# Patient Record
Sex: MALE | Race: WHITE | HISPANIC OR LATINO | Employment: OTHER | URBAN - METROPOLITAN AREA
[De-identification: names, ages, dates, MRNs, and addresses within clinical notes are randomized per-mention and may not be internally consistent; named-entity substitution may affect disease eponyms.]

---

## 2022-05-30 ENCOUNTER — HOSPITAL ENCOUNTER (OUTPATIENT)
Dept: RADIOLOGY | Facility: HOSPITAL | Age: 42
Discharge: HOME | End: 2022-05-30
Attending: PHYSICIAN ASSISTANT
Payer: COMMERCIAL

## 2022-05-30 ENCOUNTER — OFFICE VISIT (OUTPATIENT)
Dept: SPORTS MEDICINE | Facility: CLINIC | Age: 42
End: 2022-05-30
Payer: COMMERCIAL

## 2022-05-30 VITALS
WEIGHT: 142.69 LBS | BODY MASS INDEX: 24.36 KG/M2 | DIASTOLIC BLOOD PRESSURE: 74 MMHG | HEIGHT: 64 IN | HEART RATE: 60 BPM | SYSTOLIC BLOOD PRESSURE: 105 MMHG

## 2022-05-30 DIAGNOSIS — M75.21 BICEPS TENDINITIS OF RIGHT SHOULDER: ICD-10-CM

## 2022-05-30 DIAGNOSIS — M25.511 ACUTE PAIN OF RIGHT SHOULDER: Primary | ICD-10-CM

## 2022-05-30 DIAGNOSIS — M25.511 RIGHT SHOULDER PAIN, UNSPECIFIED CHRONICITY: ICD-10-CM

## 2022-05-30 PROCEDURE — 73030 X-RAY EXAM OF SHOULDER: CPT | Mod: TC,RT

## 2022-05-30 PROCEDURE — 99999 PR PBB SHADOW E&M-NEW PATIENT-LVL III: CPT | Mod: PBBFAC,,, | Performed by: PHYSICIAN ASSISTANT

## 2022-05-30 PROCEDURE — 73030 X-RAY EXAM OF SHOULDER: CPT | Mod: 26,RT,, | Performed by: RADIOLOGY

## 2022-05-30 PROCEDURE — 99999 PR PBB SHADOW E&M-NEW PATIENT-LVL III: ICD-10-PCS | Mod: PBBFAC,,, | Performed by: PHYSICIAN ASSISTANT

## 2022-05-30 PROCEDURE — 99204 PR OFFICE/OUTPT VISIT, NEW, LEVL IV, 45-59 MIN: ICD-10-PCS | Mod: S$GLB,,, | Performed by: PHYSICIAN ASSISTANT

## 2022-05-30 PROCEDURE — 99204 OFFICE O/P NEW MOD 45 MIN: CPT | Mod: S$GLB,,, | Performed by: PHYSICIAN ASSISTANT

## 2022-05-30 PROCEDURE — 73030 XR SHOULDER COMPLETE 2 OR MORE VIEWS RIGHT: ICD-10-PCS | Mod: 26,RT,, | Performed by: RADIOLOGY

## 2022-06-03 NOTE — PROGRESS NOTES
CC: right shoulder pain     41 y.o. Male [employer], who reports that the pain is severe and not responding to any conservative care.  RHD    He reports anterior and superior shoulder pain that began about 2 weeks ago shortly after doing an intense dumbbell workout prior to this. He is very active and now reports that it is hard to lift objects with his right arm and hard to do pushups due to pain.     He reports that the pain is worse with overhead activity. It also bothers him at night.    Is affecting ADLs.     He does reports a previous stinger/right shoulder injury to his right shoulder while playing football in . No surgery needed for this. He does not remember specifics.       Review of Systems   Constitution: Negative. Negative for chills, fever and night sweats.   HENT: Negative for congestion and headaches.    Eyes: Negative for blurred vision, left vision loss and right vision loss.   Cardiovascular: Negative for chest pain and syncope.   Respiratory: Negative for cough and shortness of breath.    Endocrine: Negative for polydipsia, polyphagia and polyuria.   Hematologic/Lymphatic: Negative for bleeding problem. Does not bruise/bleed easily.   Skin: Negative for dry skin, itching and rash.   Musculoskeletal: Negative for falls and muscle weakness.   Gastrointestinal: Negative for abdominal pain and bowel incontinence.   Genitourinary: Negative for bladder incontinence and nocturia.   Neurological: Negative for disturbances in coordination, loss of balance and seizures.   Psychiatric/Behavioral: Negative for depression. The patient does not have insomnia.    Allergic/Immunologic: Negative for hives and persistent infections.     PAST MEDICAL HISTORY: History reviewed. No pertinent past medical history.  PAST SURGICAL HISTORY: History reviewed. No pertinent surgical history.  FAMILY HISTORY: History reviewed. No pertinent family history.  SOCIAL HISTORY:   Social History     Socioeconomic  "History    Marital status: Single       MEDICATIONS: No current outpatient medications on file.  ALLERGIES: Review of patient's allergies indicates:  No Known Allergies    VITAL SIGNS: /74   Pulse 60   Ht 5' 3.5" (1.613 m)   Wt 64.7 kg (142 lb 11.2 oz)   BMI 24.88 kg/m²      PHYSICAL EXAMINATION:  General:  The patient is alert and oriented x 3.  Mood is pleasant.  Observation of ears, eyes and nose reveal no gross abnormalities.  No labored breathing observed.  Gait is coordinated. Patient can toe walk and heel walk without difficulty.      right SHOULDER / UPPER EXTREMITY EXAM    OBSERVATION:     Swelling  none  Deformity  none   Discoloration  none   Scapular winging none   Scars   none  Atrophy  none    TENDERNESS / CREPITUS (T/C):          T/C      T/C   Clavicle   -/-  SUPRAspinatus    -/-     AC Jt.    -/-  INFRAspinatus  -/-    SC Jt.    -/-  Deltoid    -/-      G. Tuberosity  -/-  LH BICEP groove  +/-   Acromion:  -/-  Midline Neck   -/-     Scapular Spine -/-  Trapezium   -/-   SMA Scapula  -/-  GH jt. line - post  -/-     Scapulothoracic  -/-         ROM: (* = with pain)  Right shoulder   Left shoulder        AROM (PROM)   AROM (PROM)   FE    170° (175°)     170° (175°)     ER at 0°    60°  (65°)    60°  (65°)   ER at 90° ABD  90°  (90°)*    90°  (90°)   IR at 90°  ABD   NA  (40°)     NA  (40°)      IR (spine level)   T11*    T10    STRENGTH: (* = with pain) RIGHT SHOULDER  LEFT SHOULDER   SCAPTION   5/5    5/5    IR    5/5    5/5   ER    5/5    5/5   BICEPS   5/5    5/5   Deltoid    5/5    5/5     SIGNS:  Painful side       NEER   +    ORYANS  neg    BOWERS   +    SPEEDS  +     DROP ARM   neg   BELLY PRESS neg   Superior escape none    LIFT-OFF  neg   X-Body ADD    neg    MOVING VALGUS neg        STABILITY TESTING    RIGHT SHOULDER   LEFT SHOULDER     Translation     Anterior  up face     up face    Posterior  up face    up face    Sulcus   < 10mm    < 10 mm     Signs   Apprehension "   neg      neg       Relocation   no change     no change      Jerk test  neg     neg    EXTREMITY NEURO-VASCULAR EXAM    Sensation grossly intact to light touch all dermatomal regions.    DTR 2+ Biceps, Triceps, BR and Negative Naimas sign   Grossly intact motor function at Elbow, Wrist and Hand   Distal pulses radial and ulnar 2+, brisk cap refill, symmetric.      NECK:  Painless FROM and spinous processes non-tender. Negative Spurlings sign.      OTHER FINDINGS:  + scapular dyskinesia    XRAYS:  Shoulder 3 view series right,  were obtained and reviewed  No convincing fracture or dislocation is noted. The osseous structures appear well mineralized and well aligned. Exostosis versus osteochondroma noted at the mid humerus which is correlated with an area of trauma patient reports years ago.         ASSESSMENT:   right:  1. Shoulder pain, acute   2.   Shoulder pain, impingement and biceps tendinitis   3.   Scapular dyskinesia      PLAN:    1. PT for right shoulder biceps tendinitis and scapular dyskinesia. eval and treat with HEP. Give workout modifications. PT for scapular stabilization: Scapular dyskinesia Scapular stabilization - [place] protocol, 1-3x/week x 6-8 weeks with HEP  2. Ice compresses  3. NSAIDs  4. Workout modifications discussed  RTC in 6 weeks if no improvement.        All questions were answered, pt will contact us for questions or concerns in the interim.    I made the decision to obtain old records of the patient including previous notes and imaging. New imaging was ordered today of the extremity or extremities evaluated. I independently reviewed and interpreted the radiographs and/or MRIs today as well as prior imaging.

## 2023-03-13 ENCOUNTER — LAB VISIT (OUTPATIENT)
Dept: LAB | Facility: HOSPITAL | Age: 43
End: 2023-03-13
Payer: COMMERCIAL

## 2023-03-13 ENCOUNTER — OFFICE VISIT (OUTPATIENT)
Dept: INTERNAL MEDICINE | Facility: CLINIC | Age: 43
End: 2023-03-13
Payer: COMMERCIAL

## 2023-03-13 VITALS
WEIGHT: 138 LBS | DIASTOLIC BLOOD PRESSURE: 80 MMHG | RESPIRATION RATE: 16 BRPM | OXYGEN SATURATION: 96 % | HEIGHT: 63 IN | SYSTOLIC BLOOD PRESSURE: 120 MMHG | BODY MASS INDEX: 24.45 KG/M2 | HEART RATE: 87 BPM

## 2023-03-13 DIAGNOSIS — Z13.220 SCREENING FOR HYPERCHOLESTEROLEMIA: ICD-10-CM

## 2023-03-13 DIAGNOSIS — Z83.3 FAMILY HISTORY OF DIET-CONTROLLED DIABETES: ICD-10-CM

## 2023-03-13 DIAGNOSIS — Z83.3 FAMILY HISTORY OF DIET-CONTROLLED DIABETES: Primary | ICD-10-CM

## 2023-03-13 DIAGNOSIS — J06.9 UPPER RESPIRATORY TRACT INFECTION, UNSPECIFIED TYPE: ICD-10-CM

## 2023-03-13 DIAGNOSIS — Z12.11 SCREENING FOR COLON CANCER: ICD-10-CM

## 2023-03-13 DIAGNOSIS — Z11.9 ENCOUNTER FOR SCREENING EXAMINATION FOR INFECTIOUS DISEASE: ICD-10-CM

## 2023-03-13 DIAGNOSIS — F17.200 SMOKER: ICD-10-CM

## 2023-03-13 LAB
ALBUMIN SERPL BCP-MCNC: 4.2 G/DL (ref 3.5–5.2)
ALP SERPL-CCNC: 50 U/L (ref 55–135)
ALT SERPL W/O P-5'-P-CCNC: 22 U/L (ref 10–44)
ANION GAP SERPL CALC-SCNC: 11 MMOL/L (ref 8–16)
AST SERPL-CCNC: 23 U/L (ref 10–40)
BASOPHILS # BLD AUTO: 0.07 K/UL (ref 0–0.2)
BASOPHILS NFR BLD: 0.6 % (ref 0–1.9)
BILIRUB SERPL-MCNC: 0.7 MG/DL (ref 0.1–1)
BUN SERPL-MCNC: 17 MG/DL (ref 6–20)
CALCIUM SERPL-MCNC: 9.7 MG/DL (ref 8.7–10.5)
CHLORIDE SERPL-SCNC: 104 MMOL/L (ref 95–110)
CHOLEST SERPL-MCNC: 196 MG/DL (ref 120–199)
CHOLEST/HDLC SERPL: 3.6 {RATIO} (ref 2–5)
CO2 SERPL-SCNC: 26 MMOL/L (ref 23–29)
CREAT SERPL-MCNC: 1.1 MG/DL (ref 0.5–1.4)
DIFFERENTIAL METHOD: ABNORMAL
EOSINOPHIL # BLD AUTO: 0 K/UL (ref 0–0.5)
EOSINOPHIL NFR BLD: 0.1 % (ref 0–8)
ERYTHROCYTE [DISTWIDTH] IN BLOOD BY AUTOMATED COUNT: 13.1 % (ref 11.5–14.5)
EST. GFR  (NO RACE VARIABLE): >60 ML/MIN/1.73 M^2
ESTIMATED AVG GLUCOSE: 103 MG/DL (ref 68–131)
GLUCOSE SERPL-MCNC: 108 MG/DL (ref 70–110)
HBA1C MFR BLD: 5.2 % (ref 4–5.6)
HCT VFR BLD AUTO: 48 % (ref 40–54)
HCV AB SERPL QL IA: NORMAL
HDLC SERPL-MCNC: 54 MG/DL (ref 40–75)
HDLC SERPL: 27.6 % (ref 20–50)
HGB BLD-MCNC: 15.3 G/DL (ref 14–18)
IMM GRANULOCYTES # BLD AUTO: 0.04 K/UL (ref 0–0.04)
IMM GRANULOCYTES NFR BLD AUTO: 0.3 % (ref 0–0.5)
LDLC SERPL CALC-MCNC: 123.8 MG/DL (ref 63–159)
LYMPHOCYTES # BLD AUTO: 1 K/UL (ref 1–4.8)
LYMPHOCYTES NFR BLD: 7.9 % (ref 18–48)
MCH RBC QN AUTO: 27.6 PG (ref 27–31)
MCHC RBC AUTO-ENTMCNC: 31.9 G/DL (ref 32–36)
MCV RBC AUTO: 87 FL (ref 82–98)
MONOCYTES # BLD AUTO: 0.6 K/UL (ref 0.3–1)
MONOCYTES NFR BLD: 4.8 % (ref 4–15)
NEUTROPHILS # BLD AUTO: 10.6 K/UL (ref 1.8–7.7)
NEUTROPHILS NFR BLD: 86.3 % (ref 38–73)
NONHDLC SERPL-MCNC: 142 MG/DL
NRBC BLD-RTO: 0 /100 WBC
PLATELET # BLD AUTO: 222 K/UL (ref 150–450)
PMV BLD AUTO: 11 FL (ref 9.2–12.9)
POTASSIUM SERPL-SCNC: 4.3 MMOL/L (ref 3.5–5.1)
PROT SERPL-MCNC: 7.5 G/DL (ref 6–8.4)
RBC # BLD AUTO: 5.54 M/UL (ref 4.6–6.2)
SODIUM SERPL-SCNC: 141 MMOL/L (ref 136–145)
TRIGL SERPL-MCNC: 91 MG/DL (ref 30–150)
WBC # BLD AUTO: 12.21 K/UL (ref 3.9–12.7)

## 2023-03-13 PROCEDURE — 99999 PR PBB SHADOW E&M-EST. PATIENT-LVL II: ICD-10-PCS | Mod: PBBFAC,,,

## 2023-03-13 PROCEDURE — 80061 LIPID PANEL: CPT

## 2023-03-13 PROCEDURE — 80053 COMPREHEN METABOLIC PANEL: CPT

## 2023-03-13 PROCEDURE — 3079F DIAST BP 80-89 MM HG: CPT | Mod: CPTII,S$GLB,,

## 2023-03-13 PROCEDURE — 87389 HIV-1 AG W/HIV-1&-2 AB AG IA: CPT

## 2023-03-13 PROCEDURE — 99203 OFFICE O/P NEW LOW 30 MIN: CPT | Mod: S$GLB,,,

## 2023-03-13 PROCEDURE — 3074F SYST BP LT 130 MM HG: CPT | Mod: CPTII,S$GLB,,

## 2023-03-13 PROCEDURE — 3074F PR MOST RECENT SYSTOLIC BLOOD PRESSURE < 130 MM HG: ICD-10-PCS | Mod: CPTII,S$GLB,,

## 2023-03-13 PROCEDURE — 99999 PR PBB SHADOW E&M-EST. PATIENT-LVL II: CPT | Mod: PBBFAC,,,

## 2023-03-13 PROCEDURE — 3008F PR BODY MASS INDEX (BMI) DOCUMENTED: ICD-10-PCS | Mod: CPTII,S$GLB,,

## 2023-03-13 PROCEDURE — 3008F BODY MASS INDEX DOCD: CPT | Mod: CPTII,S$GLB,,

## 2023-03-13 PROCEDURE — 86803 HEPATITIS C AB TEST: CPT

## 2023-03-13 PROCEDURE — 99203 PR OFFICE/OUTPT VISIT, NEW, LEVL III, 30-44 MIN: ICD-10-PCS | Mod: S$GLB,,,

## 2023-03-13 PROCEDURE — 83036 HEMOGLOBIN GLYCOSYLATED A1C: CPT

## 2023-03-13 PROCEDURE — 3079F PR MOST RECENT DIASTOLIC BLOOD PRESSURE 80-89 MM HG: ICD-10-PCS | Mod: CPTII,S$GLB,,

## 2023-03-13 PROCEDURE — 85025 COMPLETE CBC W/AUTO DIFF WBC: CPT

## 2023-03-13 PROCEDURE — 36415 COLL VENOUS BLD VENIPUNCTURE: CPT

## 2023-03-13 NOTE — PROGRESS NOTES
I have reviewed and concur with the resident's history, physical, assessment, and plan.  See below addendum for my evaluation and additional findings.     41 y/o M with PMH of substance use disorder (alcohol and opiate) 11 year sober who reports to clinic for an annual exam. Patient reports being in his normal state of health for the most part but reports around this time of the year he gets frequent URIs. He has had multiple sick contacts lately but tested negative for COVID recently. Today he is having some arthralgias, fatigue and URI symptoms. Will test for Influenza and recommend OTC symptoms control. Patient is due for basic labs and needs HIV and Hep C screening. Additionally he is due for his first screening colonoscopy and shared decision making to hold immunizations for today due to patient already feeling under the weather. Return to clinic in 1 year or sooner if need be.

## 2023-03-13 NOTE — PROGRESS NOTES
Clinic Note  3/13/2023      Subjective:       Patient ID:  Olegario is a 42 y.o. male being seen to [facility] care.       He reports that he has not had a PCP for multiple years. He reports that he has been experiencing frequent sore throat, aches, myalgias and joint pain. Last episodes were December, January and Today. He reports sick contact this episode. He took a covid test yesterday and was positive. He reports the episodes December he had sick exposures with his school aged nephew and niece. He also reports subjective fever today. He denies SOB.     He reports joint pain in the wrist, knees and shoulders. He reports morning stiffness but does not notice improvement throughout the day. He has a family history of psoriasis in his mother. He denies any rash, dry eyes.     He reports resistance weight training for exercise.     He also reports hands become numb in the cold. He denies cold intolerance or pallor of the digits.        No past medical history on file.    No past surgical history on file.    No family history on file.    Social History     Socioeconomic History    Marital status: Single   Social History Narrative    ** Merged History Encounter **            Review of Systems   Constitutional:  Positive for fever (Subjective) and malaise/fatigue. Negative for chills, diaphoresis and weight loss.   HENT:  Positive for congestion and sore throat. Negative for hearing loss and sinus pain.    Eyes:  Negative for blurred vision, double vision and photophobia.   Respiratory:  Negative for cough, shortness of breath and wheezing.    Cardiovascular:  Negative for chest pain, palpitations, orthopnea, leg swelling and PND.   Gastrointestinal:  Negative for abdominal pain, constipation, diarrhea, nausea and vomiting.   Genitourinary:  Negative for dysuria, flank pain and hematuria.   Musculoskeletal:  Positive for joint pain. Negative for falls and neck pain.   Skin:  Negative for rash.   Neurological:  Negative for  "dizziness, tingling, sensory change and headaches.         There is no problem list on file for this patient.              Objective:      There were no vitals taken for this visit.  Estimated body mass index is 24.88 kg/m² as calculated from the following:    Height as of 5/30/22: 5' 3.5" (1.613 m).    Weight as of 5/30/22: 64.7 kg (142 lb 11.2 oz).      Physical Exam  Constitutional:       Appearance: Normal appearance. He is normal weight.   HENT:      Head: Atraumatic.      Right Ear: External ear normal.      Left Ear: External ear normal.      Nose: Nose normal. No congestion or rhinorrhea.      Mouth/Throat:      Mouth: Mucous membranes are moist.      Pharynx: Oropharynx is clear. Posterior oropharyngeal erythema present. No oropharyngeal exudate.   Eyes:      General: No scleral icterus.     Extraocular Movements: Extraocular movements intact.      Conjunctiva/sclera: Conjunctivae normal.   Cardiovascular:      Rate and Rhythm: Normal rate and regular rhythm.      Pulses: Normal pulses.      Heart sounds: Normal heart sounds. No murmur heard.    No gallop.   Pulmonary:      Effort: Pulmonary effort is normal.      Breath sounds: Normal breath sounds. No wheezing or rales.   Abdominal:      General: Abdomen is flat. There is no distension.      Palpations: Abdomen is soft.      Tenderness: There is no abdominal tenderness.   Musculoskeletal:         General: No swelling or tenderness. Normal range of motion.      Cervical back: Normal range of motion and neck supple. No tenderness.      Right lower leg: No edema.      Left lower leg: No edema.   Skin:     General: Skin is warm and dry.      Capillary Refill: Capillary refill takes less than 2 seconds.      Coloration: Skin is not jaundiced.      Findings: No rash.   Neurological:      General: No focal deficit present.      Mental Status: He is alert and oriented to person, place, and time. Mental status is at baseline.   Psychiatric:         Mood and " Affect: Mood normal.         Behavior: Behavior normal.         Assessment and Plan:         Problem List Items Addressed This Visit    None  Visit Diagnoses       Family history of diet-controlled diabetes    -  Primary    Relevant Orders    COMPREHENSIVE METABOLIC PANEL    CBC W/ AUTO DIFFERENTIAL    HEMOGLOBIN A1C    Screening for hypercholesterolemia        Relevant Orders    LIPID PANEL    Upper respiratory tract infection, unspecified type        Relevant Orders    POCT Influenza A/B Rapid Antigen    Screening for colon cancer        Relevant Orders    Ambulatory referral/consult to Endo Procedure     Smoker        Relevant Orders    CT Chest Lung Screening Low Dose    Encounter for screening examination for infectious disease        Relevant Orders    HIV 1/2 Ag/Ab (4th Gen)    HEPATITIS C ANTIBODY            Follow Up:       Diagnoses and all orders for this visit:    41yo M with no PMHx presents to [facility] care. Today with sore throat, malaise and joint pain x2 days. COVID negative at home. Will flu swab. Vaccinations deferred today. Other HM scheduled and planned. Baseline labs, Lipids, A1c and Hepc/HIV ordered.      Family history of diet-controlled diabetes  -     COMPREHENSIVE METABOLIC PANEL; Future  -     CBC W/ AUTO DIFFERENTIAL; Future  -     HEMOGLOBIN A1C; Future    Screening for hypercholesterolemia  -     LIPID PANEL; Future    Upper respiratory tract infection, unspecified type  -     POCT Influenza A/B Rapid Antigen    Screening for colon cancer  -     Ambulatory referral/consult to Endo Procedure ; Future    Smoker  -     CT Chest Lung Screening Low Dose; Future    Encounter for screening examination for infectious disease  -     HIV 1/2 Ag/Ab (4th Gen); Future  -     HEPATITIS C ANTIBODY; Future            Other Orders Placed This Visit:  Orders Placed This Encounter   Procedures    CT Chest Lung Screening Low Dose    COMPREHENSIVE METABOLIC PANEL    CBC W/ AUTO  DIFFERENTIAL    HEMOGLOBIN A1C    LIPID PANEL    HIV 1/2 Ag/Ab (4th Gen)    HEPATITIS C ANTIBODY    Ambulatory referral/consult to Endo Procedure     POCT Influenza A/B Rapid Antigen             Interview, Assessment, Findings, and Plan discussed with Dr. Molina    No follow-ups on file.    Thiago Chisholm DO, MS   Internal Medicine

## 2023-03-14 LAB — HIV 1+2 AB+HIV1 P24 AG SERPL QL IA: NORMAL

## 2023-03-16 ENCOUNTER — PATIENT MESSAGE (OUTPATIENT)
Dept: INTERNAL MEDICINE | Facility: CLINIC | Age: 43
End: 2023-03-16
Payer: COMMERCIAL

## 2023-03-27 ENCOUNTER — HOSPITAL ENCOUNTER (OUTPATIENT)
Dept: RADIOLOGY | Facility: HOSPITAL | Age: 43
Discharge: HOME | End: 2023-03-27
Payer: COMMERCIAL

## 2023-03-27 DIAGNOSIS — F17.200 SMOKER: ICD-10-CM

## 2023-03-27 PROCEDURE — 71271 CT THORAX LUNG CANCER SCR C-: CPT | Mod: 26,,, | Performed by: STUDENT IN AN ORGANIZED HEALTH CARE EDUCATION/TRAINING PROGRAM

## 2023-03-27 PROCEDURE — 71271 CT CHEST LUNG SCREENING LOW DOSE: ICD-10-PCS | Mod: 26,,, | Performed by: STUDENT IN AN ORGANIZED HEALTH CARE EDUCATION/TRAINING PROGRAM

## 2023-03-27 PROCEDURE — 71271 CT THORAX LUNG CANCER SCR C-: CPT | Mod: TC

## 2023-03-30 ENCOUNTER — PATIENT MESSAGE (OUTPATIENT)
Dept: INTERNAL MEDICINE | Facility: CLINIC | Age: 43
End: 2023-03-30
Payer: COMMERCIAL

## 2023-03-30 DIAGNOSIS — B00.9 HSV-1 (HERPES SIMPLEX VIRUS 1) INFECTION: Primary | ICD-10-CM

## 2023-03-31 RX ORDER — VALACYCLOVIR HYDROCHLORIDE 500 MG/1
500 TABLET, FILM COATED ORAL DAILY
Qty: 30 TABLET | Refills: 11 | Status: SHIPPED | OUTPATIENT
Start: 2023-03-31 | End: 2024-03-30

## 2023-04-03 ENCOUNTER — PATIENT MESSAGE (OUTPATIENT)
Dept: INTERNAL MEDICINE | Facility: CLINIC | Age: 43
End: 2023-04-03
Payer: COMMERCIAL

## 2023-05-29 ENCOUNTER — TELEPHONE (OUTPATIENT)
Dept: ENDOSCOPY | Facility: HOSPITAL | Age: 43
End: 2023-05-29
Payer: COMMERCIAL

## 2023-07-17 ENCOUNTER — TELEPHONE (OUTPATIENT)
Dept: ENDOSCOPY | Facility: HOSPITAL | Age: 43
End: 2023-07-17

## 2023-07-17 ENCOUNTER — CLINICAL SUPPORT (OUTPATIENT)
Dept: ENDOSCOPY | Facility: HOSPITAL | Age: 43
End: 2023-07-17
Payer: COMMERCIAL

## 2023-07-17 VITALS — BODY MASS INDEX: 24.45 KG/M2 | HEIGHT: 63 IN | WEIGHT: 138 LBS

## 2023-07-17 DIAGNOSIS — Z12.11 SCREENING FOR COLON CANCER: ICD-10-CM

## 2023-07-17 DIAGNOSIS — Z12.11 COLON CANCER SCREENING: Primary | ICD-10-CM

## 2023-07-17 NOTE — PLAN OF CARE
Spoke to patient to schedule procedure(s) Colonoscopy       Physician to perform procedure(s) Dr. ILIA Sánchez  Date of Procedure (s) 9/11/23  Arrival Time 2:45 PM  Time of Procedure(s) 3:45 PM   Location of Procedure(s) [place] 4th Floor  Type of Rx Prep sent to patient: PEG  Instructions provided to patient via [facility]    Patient was informed on the following information and verbalized understanding. Screening questionnaire reviewed with patient and complete. If procedure requires anesthesia, a responsible adult needs to be present to accompany the patient home, patient cannot drive after receiving anesthesia. Appointment details are tentative, especially check-in time. Patient will receive a prep-op call 4 days prior to confirm check-in time for procedure. If applicable the patient should contact their pharmacy to verify Rx for procedure prep is ready for pick-up. Patient was advised to call the scheduling department at [phone] if pharmacy states no Rx is available. Patient was advised to call the endoscopy scheduling department if any questions or concerns arise.      [facility] Scheduling Department

## 2023-07-17 NOTE — TELEPHONE ENCOUNTER
Spoke to patient to schedule procedure(s) Colonoscopy       Physician to perform procedure(s) Dr. ILIA Sánchez  Date of Procedure (s) 9/11/23  Arrival Time 2:45 PM  Time of Procedure(s) 3:45 PM   Location of Procedure(s) [place] 4th Floor  Type of Rx Prep sent to patient: PEG  Instructions provided to patient via [facility]    Patient was informed on the following information and verbalized understanding. Screening questionnaire reviewed with patient and complete. If procedure requires anesthesia, a responsible adult needs to be present to accompany the patient home, patient cannot drive after receiving anesthesia. Appointment details are tentative, especially check-in time. Patient will receive a prep-op call 4 days prior to confirm check-in time for procedure. If applicable the patient should contact their pharmacy to verify Rx for procedure prep is ready for pick-up. Patient was advised to call the scheduling department at [phone] if pharmacy states no Rx is available. Patient was advised to call the endoscopy scheduling department if any questions or concerns arise.      [facility] Scheduling Department

## 2023-09-07 ENCOUNTER — TELEPHONE (OUTPATIENT)
Dept: ENDOSCOPY | Facility: HOSPITAL | Age: 43
End: 2023-09-07
Payer: COMMERCIAL

## 2023-09-07 NOTE — TELEPHONE ENCOUNTER
Contacted the patient to confirm upcoming procedure on 9/11. The patient answered the call and verbalized understanding.

## 2024-04-22 ENCOUNTER — OFFICE VISIT (OUTPATIENT)
Dept: INTERNAL MEDICINE | Facility: CLINIC | Age: 44
End: 2024-04-22
Payer: COMMERCIAL

## 2024-04-22 VITALS
HEART RATE: 82 BPM | WEIGHT: 148.56 LBS | BODY MASS INDEX: 26.32 KG/M2 | RESPIRATION RATE: 12 BRPM | SYSTOLIC BLOOD PRESSURE: 110 MMHG | DIASTOLIC BLOOD PRESSURE: 65 MMHG | HEIGHT: 63 IN

## 2024-04-22 DIAGNOSIS — M53.3 PAIN IN THE COCCYX: ICD-10-CM

## 2024-04-22 DIAGNOSIS — Z11.3 ROUTINE SCREENING FOR STI (SEXUALLY TRANSMITTED INFECTION): ICD-10-CM

## 2024-04-22 DIAGNOSIS — M25.541 ARTHRALGIA OF RIGHT HAND: ICD-10-CM

## 2024-04-22 DIAGNOSIS — Z00.00 VISIT FOR ANNUAL HEALTH EXAMINATION: Primary | ICD-10-CM

## 2024-04-22 DIAGNOSIS — F17.200 SMOKER: ICD-10-CM

## 2024-04-22 PROCEDURE — 3074F SYST BP LT 130 MM HG: CPT | Mod: CPTII,S$GLB,,

## 2024-04-22 PROCEDURE — 1160F RVW MEDS BY RX/DR IN RCRD: CPT | Mod: CPTII,S$GLB,,

## 2024-04-22 PROCEDURE — 3008F BODY MASS INDEX DOCD: CPT | Mod: CPTII,S$GLB,,

## 2024-04-22 PROCEDURE — 90471 IMMUNIZATION ADMIN: CPT | Mod: S$GLB,,, | Performed by: STUDENT IN AN ORGANIZED HEALTH CARE EDUCATION/TRAINING PROGRAM

## 2024-04-22 PROCEDURE — 87491 CHLMYD TRACH DNA AMP PROBE: CPT

## 2024-04-22 PROCEDURE — 99999 PR PBB SHADOW E&M-EST. PATIENT-LVL III: CPT | Mod: PBBFAC,,,

## 2024-04-22 PROCEDURE — 99396 PREV VISIT EST AGE 40-64: CPT | Mod: 25,S$GLB,,

## 2024-04-22 PROCEDURE — 90715 TDAP VACCINE 7 YRS/> IM: CPT | Mod: S$GLB,,, | Performed by: STUDENT IN AN ORGANIZED HEALTH CARE EDUCATION/TRAINING PROGRAM

## 2024-04-22 PROCEDURE — 1159F MED LIST DOCD IN RCRD: CPT | Mod: CPTII,S$GLB,,

## 2024-04-22 PROCEDURE — 3078F DIAST BP <80 MM HG: CPT | Mod: CPTII,S$GLB,,

## 2024-04-22 NOTE — PATIENT INSTRUCTIONS
Focus on increasing your physical activity to aim for at least [id number] steps/day and exercising 5x/week for 30 minutes.

## 2024-04-22 NOTE — PROGRESS NOTES
I have reviewed the notes, assessments, and/or procedures performed by Dr. Massey, I concur with her/his documentation of Karthikeyan Sarmiento.  Date of Service: 4/22/2024

## 2024-04-22 NOTE — PROGRESS NOTES
Internal Medicine Clinic Note    Subjective     Chief Complaint:  annual visit    History of Present Illness:  Mr. Karthikeyan Sarmiento is a 43 y.o. male who presents for an annual exam. He has no acute complaints today. He was previously seen by Dr. Chisholm in 2023.    He notes that he has had joint pains on and off for the past few years. He sprained his back a few weeks ago at the gym using the rowing machine and notes that he has some discomfort in his tailbone. He notices the discomfort in certain positions or when he leans against counters. He denies any radiating pain to his hips, fever/chills, numbness, restricted ROM. He previously had joint pain and tendonitis in his right shoulder. Previous left knee injury while training for a half marathon. He states he does some light stretches before working out, but no consistent stretching routine.    He also notes that he occasionally gets flares of pain/localized swelling in right thumb that are related to activity. He works in a bicycle shop and notes that sometimes he gets pain in the thumb joint down his MCP that make gripping and twisting movements painful. When this occurs he will take Aleve to help with the pain. He has not tried ice/rest/thumb splints in the past and states the discomfort is not significantly impacting his day-to-day activities.    Has a family history of breast cancer with sister in 40s, and family history of heart disease with his father in mid-60s. No history of colon cancer    Current smoker - smokes cigars 3x/week. Previous cigarette use 1ppd from 5558-3552. Is interested in trying nicotine replacement with Zin buccal nicotine. Discussed cardiac impacts of nicotine replacement, though overall reduced risk compared to inhaled tobacco use. Had low dose CT scan in 2023 with no suspicious findings.    Currently sexually active with one partner and using condoms usually. No current concern for STI however will obtain routine screening  today due to patient request. Reports ~5 partners in past 6 months.    Workout 2x/week - strength training on one day and mixed strength and light cardio on other day. States he stands for work a lot during the day but is not sure of his step count through the day.    Health Maintenance:    - DM screening/A1C: 5.2; will repeat with annual labs  - Lipid panel screening: completed in march 2023  -TDAP Vaccine: last performed 1995, due for Tdap today  - Influenza Vaccination: postponed until 3668-3945 season  - Covid Vaccination: completed initial vaccination series      Statin use for prevention of CVD in adults: not indicated at this time  The 10-year ASCVD risk score (King[place] MCMULLEN, et al., 2019) is: 0.8%    Values used to calculate the score:      Age: 43 years      Sex: Male      Is Non- : No      Diabetic: No      Tobacco smoker: No      Systolic Blood Pressure: 110 mmHg      Is BP treated: No      HDL Cholesterol: 54 mg/dL      Total Cholesterol: 196 mg/dL          Review of Systems   Constitutional:  Negative for chills, fever and malaise/fatigue.   HENT:  Negative for congestion and sore throat.    Eyes:  Negative for blurred vision and pain.   Respiratory:  Negative for cough and shortness of breath.    Cardiovascular:  Negative for chest pain, palpitations and leg swelling.   Gastrointestinal:  Negative for abdominal pain, nausea and vomiting.   Genitourinary:  Negative for dysuria and hematuria.   Musculoskeletal:  Negative for falls, joint pain and myalgias.   Skin:  Negative for rash.   Neurological:  Negative for dizziness, weakness and headaches.       PAST HISTORY:     No past medical history on file.    No past surgical history on file.    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 Stopped in past 4 monhts. 6451-9320 1ppd.      Cigars 2023 3/week.    Substance and Sexual Activity    Alcohol use: Not Currently    Drug use: Not Currently   Social History Narrative    ** Merged History Encounter **              MEDICATIONS & ALLERGIES:     Current Outpatient Medications   Medication Sig Dispense Refill    valACYclovir (VALTREX) 500 MG tablet Take 1 tablet (500 mg total) by mouth once daily. 30 tablet 11     No current facility-administered medications for this visit.       Review of patient's allergies indicates:  No Known Allergies    OBJECTIVE:     Vital Signs:  Vitals:    04/22/24 0814   BP: 110/65   BP Location: Left arm   Patient Position: Sitting   BP Method: Small (Manual)   Pulse: 82   Resp: 12   Weight: 67.4 kg (148 lb 9.4 oz)   Height: 5' 3" (1.6 m)       Body mass index is 26.32 kg/m².     Physical Exam  Vitals reviewed.   Constitutional:       General: He is not in acute distress.     Appearance: Normal appearance.   HENT:      Head: Normocephalic and atraumatic.      Nose: Nose normal.      Mouth/Throat:      Mouth: Mucous membranes are moist.      Pharynx: Oropharynx is clear.   Eyes:      Pupils: Pupils are equal, round, and reactive to light.   Cardiovascular:      Rate and Rhythm: Normal rate and regular rhythm.      Pulses: Normal pulses.      Heart sounds: Normal heart sounds.   Pulmonary:      Effort: Pulmonary effort is normal. No respiratory distress.      Breath sounds: Normal breath sounds.   Abdominal:      General: " "Bowel sounds are normal.      Palpations: Abdomen is soft.      Tenderness: There is no abdominal tenderness.   Musculoskeletal:         General: Tenderness (coccyx -tender to palpation) present. No swelling. Normal range of motion.      Cervical back: Normal range of motion.      Right lower leg: No edema.      Left lower leg: No edema.   Skin:     General: Skin is warm.      Findings: No erythema.   Neurological:      General: No focal deficit present.      Mental Status: He is alert and oriented to person, place, and time.   Psychiatric:         Mood and Affect: Mood normal.         Behavior: Behavior normal.         Laboratory  Lab Results   Component Value Date    WBC 12.21 03/13/2023    HGB 15.3 03/13/2023    HCT 48.0 03/13/2023    MCV 87 03/13/2023     03/13/2023     BMP  Lab Results   Component Value Date     03/13/2023    K 4.3 03/13/2023     03/13/2023    CO2 26 03/13/2023    BUN 17 03/13/2023    CREATININE 1.1 03/13/2023    CALCIUM 9.7 03/13/2023    ANIONGAP 11 03/13/2023    EGFRNORACEVR >60.0 03/13/2023     No results found for: "INR", "PROTIME"  Lab Results   Component Value Date    HGBA1C 5.2 03/13/2023       Diagnostic Results:  Labs: Reviewed  CT: Reviewed    There are no preventive care reminders to display for this patient.        ASSESSMENT & PLAN:   Mr. Karthikeyan Sarmiento is a 43 y.o. male who presents to clinic for annual health examination. He has no acute complaints today.         -     Discussed smoking cessation therapies and patient is interested in trying buccal nicotine replacement. Discussed concerns for cardiovascular risk        -     Joint pain does not seem consistent with systemic inflammatory response, however will assess inflammatory markers. No SI joint pain, no need for lumbar x-ray today. Discussed stretching, use of rest/ice/NSAID use. Should symptoms persist or worsen, will obtain hand x-ray and consider use of thumb splint v. Further intervention " for pain relief (injections?)        -     Will obtain routine labs and STI screening panel today        -     Due for follow up lung CT; will start colon cancer screening at age 47    Visit for annual health examination  -     COMPREHENSIVE METABOLIC PANEL; Future; Expected date: 04/22/2024  -     Lipid Panel; Future; Expected date: 04/22/2024  -     Hemoglobin A1C; Future; Expected date: 04/22/2024    Smoker  -     CT Chest Lung Screening Low Dose; Future; Expected date: 04/22/2024    Routine screening for STI (sexually transmitted infection)  -     HIV 1/2 Ag/Ab (4th Gen); Future; Expected date: 04/22/2024  -     C. trachomatis/N. gonorrhoeae by AMP DNA [facility]; Urine  -     Treponema Pallidium Antibodies IgG, IgM; Future; Expected date: 04/22/2024  -     HEPATITIS PANEL, ACUTE; Future; Expected date: 04/22/2024    Arthralgia of right hand  -     Sedimentation rate; Future; Expected date: 04/22/2024  -     C-REACTIVE PROTEIN; Future; Expected date: 04/22/2024    Pain in the coccyx  -     Sedimentation rate; Future; Expected date: 04/22/2024  -     C-REACTIVE PROTEIN; Future; Expected date: 04/22/2024    Other orders  -     (In Office Administered) Tdap Vaccine        RTC in 1 year    Discussed with Dr. Figueroa  - staff attestation to follow        Graciela Vanegas MD, MPH  Internal Medicine, PGY-3  [facility]  [address] [place] [id number]  [phone]

## 2024-04-23 LAB
C TRACH DNA SPEC QL NAA+PROBE: NOT DETECTED
N GONORRHOEA DNA SPEC QL NAA+PROBE: NOT DETECTED

## 2024-04-29 ENCOUNTER — LAB VISIT (OUTPATIENT)
Dept: LAB | Facility: HOSPITAL | Age: 44
End: 2024-04-29
Payer: COMMERCIAL

## 2024-04-29 DIAGNOSIS — Z11.3 ROUTINE SCREENING FOR STI (SEXUALLY TRANSMITTED INFECTION): ICD-10-CM

## 2024-04-29 DIAGNOSIS — Z00.00 VISIT FOR ANNUAL HEALTH EXAMINATION: ICD-10-CM

## 2024-04-29 DIAGNOSIS — M25.541 ARTHRALGIA OF RIGHT HAND: ICD-10-CM

## 2024-04-29 DIAGNOSIS — M53.3 PAIN IN THE COCCYX: ICD-10-CM

## 2024-04-29 LAB
ALBUMIN SERPL BCP-MCNC: 4 G/DL (ref 3.5–5.2)
ALP SERPL-CCNC: 45 U/L (ref 55–135)
ALT SERPL W/O P-5'-P-CCNC: 26 U/L (ref 10–44)
ANION GAP SERPL CALC-SCNC: 7 MMOL/L (ref 8–16)
AST SERPL-CCNC: 31 U/L (ref 10–40)
BILIRUB SERPL-MCNC: 0.8 MG/DL (ref 0.1–1)
BUN SERPL-MCNC: 18 MG/DL (ref 6–20)
CALCIUM SERPL-MCNC: 9.8 MG/DL (ref 8.7–10.5)
CHLORIDE SERPL-SCNC: 108 MMOL/L (ref 95–110)
CHOLEST SERPL-MCNC: 199 MG/DL (ref 120–199)
CHOLEST/HDLC SERPL: 4.1 {RATIO} (ref 2–5)
CO2 SERPL-SCNC: 27 MMOL/L (ref 23–29)
CREAT SERPL-MCNC: 1 MG/DL (ref 0.5–1.4)
CRP SERPL-MCNC: 0.5 MG/L (ref 0–8.2)
ERYTHROCYTE [SEDIMENTATION RATE] IN BLOOD BY PHOTOMETRIC METHOD: <2 MM/HR (ref 0–23)
EST. GFR  (NO RACE VARIABLE): >60 ML/MIN/1.73 M^2
GLUCOSE SERPL-MCNC: 93 MG/DL (ref 70–110)
HAV IGM SERPL QL IA: NORMAL
HBV CORE IGM SERPL QL IA: NORMAL
HBV SURFACE AG SERPL QL IA: NORMAL
HCV AB SERPL QL IA: NORMAL
HDLC SERPL-MCNC: 49 MG/DL (ref 40–75)
HDLC SERPL: 24.6 % (ref 20–50)
HIV 1+2 AB+HIV1 P24 AG SERPL QL IA: NORMAL
LDLC SERPL CALC-MCNC: 134.2 MG/DL (ref 63–159)
NONHDLC SERPL-MCNC: 150 MG/DL
POTASSIUM SERPL-SCNC: 4.3 MMOL/L (ref 3.5–5.1)
PROT SERPL-MCNC: 7 G/DL (ref 6–8.4)
SODIUM SERPL-SCNC: 142 MMOL/L (ref 136–145)
TREPONEMA PALLIDUM IGG+IGM AB [PRESENCE] IN SERUM OR PLASMA BY IMMUNOASSAY: NONREACTIVE
TRIGL SERPL-MCNC: 79 MG/DL (ref 30–150)

## 2024-04-29 PROCEDURE — 86593 SYPHILIS TEST NON-TREP QUANT: CPT

## 2024-04-29 PROCEDURE — 36415 COLL VENOUS BLD VENIPUNCTURE: CPT

## 2024-04-29 PROCEDURE — 86140 C-REACTIVE PROTEIN: CPT

## 2024-04-29 PROCEDURE — 80074 ACUTE HEPATITIS PANEL: CPT

## 2024-04-29 PROCEDURE — 87389 HIV-1 AG W/HIV-1&-2 AB AG IA: CPT

## 2024-04-29 PROCEDURE — 80061 LIPID PANEL: CPT

## 2024-04-29 PROCEDURE — 80053 COMPREHEN METABOLIC PANEL: CPT

## 2024-04-29 PROCEDURE — 85652 RBC SED RATE AUTOMATED: CPT

## 2024-08-23 ENCOUNTER — HOSPITAL ENCOUNTER (OUTPATIENT)
Dept: RADIOLOGY | Facility: HOSPITAL | Age: 44
Discharge: HOME | End: 2024-08-23
Payer: COMMERCIAL

## 2024-08-23 ENCOUNTER — OFFICE VISIT (OUTPATIENT)
Dept: INTERNAL MEDICINE | Facility: CLINIC | Age: 44
End: 2024-08-23
Payer: COMMERCIAL

## 2024-08-23 VITALS
SYSTOLIC BLOOD PRESSURE: 120 MMHG | WEIGHT: 149.69 LBS | HEIGHT: 64 IN | BODY MASS INDEX: 25.56 KG/M2 | DIASTOLIC BLOOD PRESSURE: 79 MMHG

## 2024-08-23 DIAGNOSIS — M53.3 PAIN IN THE COCCYX: Primary | ICD-10-CM

## 2024-08-23 DIAGNOSIS — M53.3 PAIN IN THE COCCYX: ICD-10-CM

## 2024-08-23 PROCEDURE — 72220 X-RAY EXAM SACRUM TAILBONE: CPT | Mod: 26,,, | Performed by: RADIOLOGY

## 2024-08-23 PROCEDURE — 72220 X-RAY EXAM SACRUM TAILBONE: CPT | Mod: TC

## 2024-08-23 PROCEDURE — 99999 PR PBB SHADOW E&M-EST. PATIENT-LVL III: CPT | Mod: PBBFAC,,,

## 2024-08-23 NOTE — PROGRESS NOTES
"Subjective     Patient ID: Karthikeyan Sarmiento is a 43 y.o. male.    Chief Complaint: Pain in coccyx    43 year old male with no past medical history presenting with about 4 months of soreness in coccxy. Initially mentioned pain to PCP back in April. At that time, he attributed it to a possible injury to his lumbar back while using the rowing machine at the gym. However, at today's visit he mentions he experienced a fall from his bike around the same time. Since then, has not experienced any more trauma to the area. States he experiences more soreness than pain, which is exacerbated when he is putting direct pressure to it as when he is leaning on a workbench. Discomfort is non-radiating. Has not noticed any lesions or bumps to the area. Does not take tylenol or Advil because "it's not worth it".       Review of Systems   Constitutional: Negative.    Respiratory: Negative.     Cardiovascular: Negative.    Musculoskeletal:         Pain in coccyx   Neurological: Negative.    Hematological: Negative.           Objective     Physical Exam  Vitals reviewed.   Constitutional:       General: He is not in acute distress.     Appearance: Normal appearance. He is normal weight. He is not ill-appearing.   HENT:      Head: Normocephalic and atraumatic.   Eyes:      Conjunctiva/sclera: Conjunctivae normal.   Cardiovascular:      Rate and Rhythm: Normal rate and regular rhythm.      Pulses: Normal pulses.      Heart sounds: Normal heart sounds. No murmur heard.  Pulmonary:      Effort: Pulmonary effort is normal.      Breath sounds: Normal breath sounds.   Musculoskeletal:      Comments: Soreness to coccyx, not reproducible to palpation.   Declined exam to evaluate for pilonidal cyst.    Skin:     General: Skin is warm and dry.   Neurological:      Mental Status: He is alert.          Assessment and Plan   Pain in the coccyx  X-Ray Sacrum And Coccyx; Future; Expected date: 08/23/2024  Recommended rest, ice, antiinflammatory " medications, use of donut hole cushion

## 2024-10-15 ENCOUNTER — OFFICE VISIT (OUTPATIENT)
Dept: INTERNAL MEDICINE | Facility: CLINIC | Age: 44
End: 2024-10-15
Payer: COMMERCIAL

## 2024-10-15 VITALS
DIASTOLIC BLOOD PRESSURE: 82 MMHG | HEART RATE: 68 BPM | BODY MASS INDEX: 26.95 KG/M2 | HEIGHT: 63 IN | WEIGHT: 152.13 LBS | SYSTOLIC BLOOD PRESSURE: 120 MMHG | OXYGEN SATURATION: 98 %

## 2024-10-15 DIAGNOSIS — M53.3 PAIN IN THE COCCYX: ICD-10-CM

## 2024-10-15 DIAGNOSIS — R42 DIZZINESS: Primary | ICD-10-CM

## 2024-10-15 PROBLEM — Z20.6 EXPOSURE TO HIV: Status: ACTIVE | Noted: 2021-07-29

## 2024-10-15 PROBLEM — B00.9 HERPES SIMPLEX: Status: ACTIVE | Noted: 2022-11-09

## 2024-10-15 PROCEDURE — 99999 PR PBB SHADOW E&M-EST. PATIENT-LVL III: CPT | Mod: PBBFAC,,,

## 2024-10-15 RX ORDER — MECLIZINE HYDROCHLORIDE 25 MG/1
25 TABLET ORAL 3 TIMES DAILY PRN
Qty: 90 TABLET | Refills: 1 | Status: SHIPPED | OUTPATIENT
Start: 2024-10-15 | End: 2024-12-14

## 2024-10-15 NOTE — PROGRESS NOTES
Karthikeyan Sarmiento  1980        Subjective     Chief Complaint: Dizziness and Sacral Pain    History of Present Illness:  Mr. Karthikeyan Sarmiento is a 43 y.o. male with a medical  hx of Herpes, Genital Warts, and coccyx pain who presents to clinic for Dizziness and Sacral Pain. Last seen on 8/23/24. Pt  has multiple complaints today, 10/15/24.     The first complaint is persistent dizziness for the past 2-3 months. Dizziness is worse in the mornings when he wakes up. Episodes usually last 4-5 hours into the afternoon. Occurs sporadically. Worse with positional changes in the head and movements. Denies room spinning sensation when he is sitting still. Accompanied by nausea. Denies vomiting, syncope, SOB, chest pain, palpitations, recent viral illness, or medication changes. Has not tried any over the counter remedies. Has been doing Epley's Maneuver a couple of times a day with minimal relief.     He is also c/o coccyx pain. Initially presented to the clinic back in August, 2024 for similar complaint. Sacral X-ray negative for any fractures or dislocations. Educated on supportive care and using a donut cushion to relieve pressure. Pt has tried ibuprofen and tylenol sporadically for pain. He denies any severe pain to the area, states it is more of a soreness now. Denies saddle anesthesia, urinary or fecal incontinence, or numbness in lower extremities. Expresses concerns about if he is missing anything causing chronic pain that would not show up on an X-ray.    Pt would also like to establish care with more consistent provider, as he has now seen 3 different residents every time he makes an appointment.       Review of Systems   Constitutional:  Negative for chills and fever.   HENT:  Negative for ear pain and hearing loss.    Eyes:  Negative for blurred vision, double vision and photophobia.   Respiratory:  Negative for cough and shortness of breath.    Cardiovascular:  Negative for chest pain,  palpitations and leg swelling.   Gastrointestinal:  Negative for abdominal pain, blood in stool, constipation, diarrhea, nausea and vomiting.   Genitourinary:  Negative for dysuria and hematuria.   Musculoskeletal:         Coccyx pain   Skin:  Negative for rash.   Neurological:  Positive for dizziness. Negative for speech change, focal weakness, seizures, loss of consciousness, weakness and headaches.   Psychiatric/Behavioral:  Negative for depression and suicidal ideas.         PAST HISTORY:     No past medical history on file.    No past surgical history on file.    Family History   Problem Relation Name Age of Onset    Psoriasis Mother      Stroke Father      Heart disease Father  66        stroke following quadruple bypass    Hypertension Father      Cancer Sister  40        breast cancer    No Known Problems Sister      No Known Problems Brother      No Known Problems Brother      Cancer Maternal Grandmother      Cancer Paternal Grandfather  60 - 69        asbestos related lung cancer       Social History     Socioeconomic History    Marital status: Single   Occupational History    Occupation: works in [employer] shop   Tobacco Use    Smoking status: Former     Types: Cigarettes, Cigars     Start date: 1/1/2023    Tobacco comments:     Cigarettes [age]. Stopped in past 4 monhts. [id number] 1ppd.      Cigars 2023 3/week.    Substance and Sexual Activity    Alcohol use: Not Currently    Drug use: Not Currently   Social History Narrative    ** Merged History Encounter **          Social Drivers of Health     Financial Resource Strain: Low Risk  (4/22/2024)    Overall Financial Resource Strain (CARDIA)     Difficulty of Paying Living Expenses: Not very hard   Food Insecurity: No Food Insecurity (4/22/2024)    Hunger Vital Sign     Worried About Running Out of Food in the Last Year: Never true     Ran Out of Food in the Last Year: Never true   Transportation Needs: No Transportation Needs (4/22/2024)    PRAPARE -  "Transportation     Lack of Transportation (Medical): No     Lack of Transportation (Non-Medical): No   Physical Activity: Unknown (4/22/2024)    Exercise Vital Sign     Days of Exercise per Week: 1 day   Stress: No Stress Concern Present (4/22/2024)    [employer] - Occupational Stress Questionnaire     Feeling of Stress : Not at all   Housing Stability: Unknown (4/22/2024)    Housing Stability Vital Sign     Unable to Pay for Housing in the Last Year: No       MEDICATIONS & ALLERGIES:     Current Outpatient Medications on File Prior to Visit   Medication Sig    valACYclovir (VALTREX) 500 MG tablet Take 1 tablet (500 mg total) by mouth once daily.     No current facility-administered medications on file prior to visit.       Review of patient's allergies indicates:   Allergen Reactions    Apricot Anaphylaxis       OBJECTIVE:     Vital Signs:  Vitals:    10/15/24 1359   BP: 120/82   Pulse: 68   SpO2: 98%   Weight: 69 kg (152 lb 1.9 oz)   Height: 5' 3" (1.6 m)       Body mass index is 26.95 kg/m².     Physical Exam:  Physical Exam  Vitals and nursing note reviewed.   Constitutional:       General: He is not in acute distress.     Appearance: Normal appearance. He is not ill-appearing.   HENT:      Head: Normocephalic and atraumatic.      Nose: Nose normal.      Mouth/Throat:      Mouth: Mucous membranes are moist.      Pharynx: Oropharynx is clear.   Eyes:      Extraocular Movements: Extraocular movements intact.      Conjunctiva/sclera: Conjunctivae normal.   Cardiovascular:      Rate and Rhythm: Normal rate and regular rhythm.      Heart sounds: Normal heart sounds. No murmur heard.  Pulmonary:      Effort: No respiratory distress.      Breath sounds: Normal breath sounds. No wheezing, rhonchi or rales.   Abdominal:      General: Abdomen is flat. Bowel sounds are normal. There is no distension.      Palpations: Abdomen is soft.      Tenderness: There is no abdominal tenderness. " "  Musculoskeletal:         General: No swelling.      Right lower leg: No edema.      Left lower leg: No edema.      Comments: Dull soreness in coccyx   Skin:     General: Skin is dry.   Neurological:      General: No focal deficit present.      Mental Status: He is alert and oriented to person, place, and time. Mental status is at baseline.      Cranial Nerves: No cranial nerve deficit.      Motor: No weakness.      Comments: [place] Hallpike maneuver declined during exam   Psychiatric:         Mood and Affect: Mood normal.         Behavior: Behavior normal.            Laboratory  Lab Results   Component Value Date    WBC 12.21 03/13/2023    HGB 15.3 03/13/2023    HCT 48.0 03/13/2023    MCV 87 03/13/2023     03/13/2023     Lab Results   Component Value Date    GLU 93 04/29/2024     04/29/2024    K 4.3 04/29/2024     04/29/2024    CO2 27 04/29/2024    BUN 18 04/29/2024    CREATININE 1.0 04/29/2024    CALCIUM 9.8 04/29/2024     No results found for: "INR", "PROTIME"  Lab Results   Component Value Date    HGBA1C 5.2 03/13/2023     No results for input(s): "POCTGLUCOSE" in the last 72 hours.      Health Maintenance         Date Due Completion Date    Influenza Vaccine (1) Never done ---    COVID-19 Vaccine (3 - 2024-25 season) 09/01/2024 5/11/2021    Hemoglobin A1c (Diabetic Prevention Screening) 03/13/2026 3/13/2023    Lipid Panel 04/29/2029 4/29/2024    TETANUS VACCINE 04/22/2034 4/22/2024    RSV Vaccine (Age 60+ and Pregnant patients) (1 - 1-dose 75+ series) 10/27/2057 ---              ASSESSMENT & PLAN:   Mr. Karthikeyan Sarmiento is a 43 y.o. male who was seen today in clinic for dizziness and coccyx pain. His dizziness sounds consistent with BPPV. Recommended him to continue performing Epley's Maneuver at home 2-3 times daily. Will also prescribe Meclizine to try and help with symptom management. ENT referral made for further evaluation if dizziness does not improve despite continued Epley's " maneuver.    For his coccyx pain, advised pt to use donut cushion and continue supportive care. He shows no signs of underlying infection or pilonidal cyst. MRI would likely not be covered by insurance, given his lack of symptoms besides soreness to the area. Likely a bone bruise to the coccyx that will need offloading of pressure from cushion and over the counter Ibuprofen.     Referral made to establish care with Staff Attending for more consistent PCP going forward.     Diagnoses and all orders for this visit:    Dizziness  -     meclizine (ANTIVERT) 25 mg tablet; Take 1 tablet (25 mg total) by mouth 3 (three) times daily as needed for Dizziness.  -     Ambulatory referral/consult to ENT; Future    Pain in the coccyx  -     Ambulatory referral/consult to Internal Medicine; Future         1. Dizziness    2. Pain in the coccyx      Rg Menchaca DO  Internal Medicine PGY-3  [facility]  [address] [place] [address]

## 2024-10-28 ENCOUNTER — OFFICE VISIT (OUTPATIENT)
Dept: INTERNAL MEDICINE | Facility: CLINIC | Age: 44
End: 2024-10-28
Payer: COMMERCIAL

## 2024-10-28 VITALS
HEART RATE: 68 BPM | WEIGHT: 151.69 LBS | SYSTOLIC BLOOD PRESSURE: 122 MMHG | DIASTOLIC BLOOD PRESSURE: 85 MMHG | OXYGEN SATURATION: 99 % | BODY MASS INDEX: 26.88 KG/M2 | HEIGHT: 63 IN

## 2024-10-28 DIAGNOSIS — M53.3 PAIN IN THE COCCYX: ICD-10-CM

## 2024-10-28 DIAGNOSIS — Z76.89 ENCOUNTER TO ESTABLISH CARE: Primary | ICD-10-CM

## 2024-10-28 DIAGNOSIS — R42 VERTIGO: ICD-10-CM

## 2024-10-28 PROCEDURE — 99999 PR PBB SHADOW E&M-EST. PATIENT-LVL IV: CPT | Mod: PBBFAC,,, | Performed by: INTERNAL MEDICINE

## 2024-10-28 RX ORDER — FLUTICASONE PROPIONATE 50 MCG
1 SPRAY, SUSPENSION (ML) NASAL DAILY
Qty: 9.9 ML | Refills: 3 | Status: SHIPPED | OUTPATIENT
Start: 2024-10-28

## 2024-11-26 ENCOUNTER — TELEPHONE (OUTPATIENT)
Dept: OTOLARYNGOLOGY | Facility: CLINIC | Age: 44
End: 2024-11-26
Payer: COMMERCIAL

## 2024-12-13 ENCOUNTER — CLINICAL SUPPORT (OUTPATIENT)
Dept: AUDIOLOGY | Facility: CLINIC | Age: 44
End: 2024-12-13
Payer: COMMERCIAL

## 2024-12-13 ENCOUNTER — OFFICE VISIT (OUTPATIENT)
Dept: OTOLARYNGOLOGY | Facility: CLINIC | Age: 44
End: 2024-12-13
Payer: COMMERCIAL

## 2024-12-13 DIAGNOSIS — R42 RECURRENT VERTIGO: Primary | ICD-10-CM

## 2024-12-13 DIAGNOSIS — R42 DIZZINESS: ICD-10-CM

## 2024-12-13 DIAGNOSIS — H93.293 ABNORMAL AUDITORY PERCEPTION OF BOTH EARS: Primary | ICD-10-CM

## 2024-12-13 PROCEDURE — 99999 PR PBB SHADOW E&M-EST. PATIENT-LVL I: CPT | Mod: PBBFAC,,,

## 2024-12-13 PROCEDURE — 99999 PR PBB SHADOW E&M-EST. PATIENT-LVL II: CPT | Mod: PBBFAC,,, | Performed by: OTOLARYNGOLOGY

## 2024-12-13 NOTE — PROGRESS NOTES
Ear, Nose, & Throat  Otolaryngology - Head & Neck Surgery    Summary of Visit:  Karthikeyan Sarmiento is a kind patient who was referred to me by Dr. Rg Menchaca in consultation for Dizziness      Subjective:     Chief Complaint:   Chief Complaint   Patient presents with    Dizziness       Karthikeyan Sarmiento is a 44 y.o. male who was referred to me by Dr. Rg Menchaca in consultation for recurrent vertigo.  Over the past year, he has had several episodes of significant vertigo which lasts for several hours.  He is also experiencing more frequent episodes which last for several seconds to a minute, as often as 2-3 times per week.  He denies any aural fullness, hearing loss, tinnitus, otalgia, otorrhea.  He does not have a history of migraine headaches.  He has not had any prior imaging studies or evaluation for this.    Past Medical History  Active Ambulatory Problems     Diagnosis Date Noted    Arthralgia of right hand 04/22/2024    Smoker 04/22/2024    Pain in the coccyx 08/23/2024    Herpes simplex 11/09/2022    Genital warts 01/07/2013    Exposure to HIV 07/29/2021     Resolved Ambulatory Problems     Diagnosis Date Noted    No Resolved Ambulatory Problems     No Additional Past Medical History       Past Surgical History  He has no past surgical history on file.    No past surgical history on file.     Family History  His family history includes Cancer in his maternal grandmother; Cancer (age of onset: 40) in his sister; Cancer (age of onset: 60 - 69) in his paternal grandfather; Heart disease (age of onset: 66) in his father; Hypertension in his father; No Known Problems in his brother, brother, and sister; Psoriasis in his mother; Stroke in his father.    Social History  He reports that he has quit smoking. His smoking use included cigarettes and cigars. He started smoking about 1 years ago. He uses smokeless tobacco. He reports that he does not currently use alcohol. He reports that he does not  currently use drugs.    Allergies  He is allergic to apricot.    Medications  He has a current medication list which includes the following prescription(s): fluticasone propionate, meclizine, and valacyclovir.    ROS:  Pertinent positive and negative review of systems as noted in HPI.     Objective:     There were no vitals taken for this visit.   General Appearance:   Awake, Alert and Oriented. NAD. Appropriate affect and appearance      Neuro:   Spontaneous eye opening, appropriate verbal responses, follows commands  Pupils equal, round & brisk. EOMI, no proptosis, no spontaneous nystagmus  Face is symmetric, HB I, non-edematous and SILT bilaterally  Vision grossly intact, Hearing grossly intact  MICHAEL, normal tone     Head and Face:   skin is intact, facial movement symmetric     Ears:  Periauricular regions non-erythematous, non-fluctuanct non-tender  Pinna normal bilaterally, no skin lesions  EACs patent and without drainage bilaterally   Tympanic membranes are normal in appearance bilaterally.  No middle ear effusion noted bilaterally.    Nose:   External nose is symmetric, no skin lesions  Septum midline, No inferior turbinate hypertrophy, No polyps or rhinorrhea     OC/OP:  Tongue midline on extension, non-edematous, soft  No labial, buccal, oral tongue or floor of mouth lesions  Soft palate symmetric, midline and without lesions or masses, tonsils symmetric  No masses or lesions of the visualized oropharynx     Neck:  Neck is symmetric, non-edematous, non-erythematous  Trachea is midline and easily palpable,  No palpable adenopathy or masses in levels I-VI     Glands:  Parotid and submandibular glands are symmetric and non-tender.   No thyroid nodules or masses, non-tender      Respiratory:  Normal work of breathing, no accessory muscle use, no stridor     Voice:    Vestibular:   Spontaneous Nystagmus: none   Smooth Pursuit: grossly unremarkable   Saccades: grossly unremarkable     Gaze-Evoked Nystagmus:  None   Head-Thrust: Normal   Gait: Normal    Elodia-Hallpike: Normal bilaterally    Post-Head Shake: No Nystagmus    Fukuda Step Test: Deviates >30 degrees right  Neuro/Psychiatric:     Affect: Normal   Cognition: Appropriate  Cerebellar   Alternating Finger to Nose: Normal   Rapid Alternating Movements: Normal   Scanning Speech: Not Present  Proprioception   Romberg: Normal  Respiratory: No increased WOB, no stridor    Normal vocal quality, volume, prosody and articulation   Data Review:     AUDIO        Procedures:       Assessment:     1. Recurrent vertigo    2. Dizziness        Plan:     I had a long discussion with the patient regarding his condition and the further workup and management options.  Most common etiologies of recurrent vertigo would be vestibular migraine and Meniere's.  He does not clearly fit into either of these diagnoses.  He has an abnormal vestibular exam suggestive of right peripheral vestibulopathy given his findings on Fukuda test.  VNG testing is ordered to corroborate this physical exam finding.  Return to clinic after this has been performed.    Orders Placed This Encounter   Procedures    Basic vestibular evaluation          Problem List Items Addressed This Visit    None  Visit Diagnoses       Recurrent vertigo    -  Primary    Relevant Orders    Basic vestibular evaluation    Dizziness

## 2024-12-13 NOTE — PROGRESS NOTES
Karthikeyan Sarmiento, a 44 y.o. male, was seen today in the clinic for an audiologic evaluation. Mr. Sarmiento reported episodic vertigo. He reported symptoms are triggered by getting in and out of bed and quick head turns. Mr. Sarmiento reported occasional tinnitus. Patient denied otalgia and aural fullness.    Tympanometry revealed Type A tympanogram in the right ear and Type A tympanogram in the left ear. Audiogram results revealed normal hearing sensitivity in the right ear and normal hearing sensitivity in the left ear.  Speech reception thresholds were noted at 10 dBHL in the right ear and 10 dBHL in the left ear.  Speech discrimination scores were 100% in the right ear and 100% in the left ear.    Recommendations:  Otologic evaluation  Repeat audiogram as needed  Hearing protection in noise

## 2025-02-06 ENCOUNTER — OFFICE VISIT (OUTPATIENT)
Dept: INTERNAL MEDICINE | Facility: CLINIC | Age: 45
End: 2025-02-06
Payer: COMMERCIAL

## 2025-02-06 VITALS
SYSTOLIC BLOOD PRESSURE: 123 MMHG | WEIGHT: 151.88 LBS | HEIGHT: 63 IN | BODY MASS INDEX: 26.91 KG/M2 | OXYGEN SATURATION: 99 % | HEART RATE: 75 BPM | DIASTOLIC BLOOD PRESSURE: 87 MMHG

## 2025-02-06 DIAGNOSIS — M53.3 PAIN IN THE COCCYX: Primary | ICD-10-CM

## 2025-02-06 DIAGNOSIS — F10.21 HISTORY OF ALCOHOLISM: ICD-10-CM

## 2025-02-06 PROCEDURE — 99214 OFFICE O/P EST MOD 30 MIN: CPT | Mod: S$GLB,,, | Performed by: INTERNAL MEDICINE

## 2025-02-06 PROCEDURE — 3074F SYST BP LT 130 MM HG: CPT | Mod: CPTII,S$GLB,, | Performed by: INTERNAL MEDICINE

## 2025-02-06 PROCEDURE — 99999 PR PBB SHADOW E&M-EST. PATIENT-LVL IV: CPT | Mod: PBBFAC,,, | Performed by: INTERNAL MEDICINE

## 2025-02-06 PROCEDURE — 3079F DIAST BP 80-89 MM HG: CPT | Mod: CPTII,S$GLB,, | Performed by: INTERNAL MEDICINE

## 2025-02-06 PROCEDURE — 3008F BODY MASS INDEX DOCD: CPT | Mod: CPTII,S$GLB,, | Performed by: INTERNAL MEDICINE

## 2025-02-06 RX ORDER — MELOXICAM 7.5 MG/1
7.5 TABLET ORAL DAILY
Qty: 14 TABLET | Refills: 1 | Status: SHIPPED | OUTPATIENT
Start: 2025-02-06

## 2025-02-06 NOTE — PROGRESS NOTES
Subjective:       Patient ID: Karthikeyan Sarmiento is a 44 y.o. male.    Chief Complaint: Tailbone Pain    HPI    Has chronic pain in sacrum. Had XRAY in April which was normal. Continues to have pain. Has tried NSAIDs. Denies any urinary or      Also states he has a remote history of alcohol abuse. He still has some things to work through related to this and would like to start therapy.     Health Maintenance:  Colon Cancer Screening:  begin at age 47   HIV: negative 2024   Hep C: negative 2024   Lipids: normal 2024   Vaccines:  up to date     Review of Systems   Constitutional:  Negative for activity change and unexpected weight change.   HENT:  Negative for hearing loss, rhinorrhea and trouble swallowing.    Eyes:  Negative for discharge and visual disturbance.   Respiratory:  Negative for chest tightness and wheezing.    Cardiovascular:  Negative for chest pain and palpitations.   Gastrointestinal:  Negative for blood in stool, constipation, diarrhea and vomiting.   Endocrine: Positive for polyuria. Negative for polydipsia.   Genitourinary:  Positive for urgency. Negative for difficulty urinating and hematuria.   Musculoskeletal:  Negative for arthralgias, joint swelling and neck pain.   Neurological:  Negative for weakness and headaches.   Psychiatric/Behavioral:  Negative for confusion and dysphoric mood.            No past medical history on file.  No past surgical history on file.   Patient Active Problem List   Diagnosis    Arthralgia of right hand    Smoker    Pain in the coccyx    Herpes simplex    Genital warts    Exposure to HIV        Objective:      Physical Exam  Constitutional:       Appearance: Normal appearance.   HENT:      Head: Normocephalic and atraumatic.   Cardiovascular:      Rate and Rhythm: Normal rate and regular rhythm.      Heart sounds: Normal heart sounds.   Pulmonary:      Effort: Pulmonary effort is normal.      Breath sounds: Normal breath sounds. No stridor. No wheezing or  "rales.   Abdominal:      General: Abdomen is flat.      Palpations: Abdomen is soft. There is no mass.      Tenderness: There is no abdominal tenderness.   Skin:     General: Skin is warm and dry.   Neurological:      Mental Status: He is alert and oriented to person, place, and time.   Psychiatric:         Mood and Affect: Mood normal.         Assessment:       Problem List Items Addressed This Visit          Orthopedic    Pain in the coccyx - Primary    Relevant Orders    Fecal Immunochemical Test (iFOBT) (Completed)     Other Visit Diagnoses         History of alcoholism        Relevant Orders    Ambulatory referral/consult to Psychology    Ambulatory referral/consult to Primary Care Behavioral Health (Non-Opioids)            Plan:         Karthikeyan Ortiz" was seen today for tailbone pain.    Diagnoses and all orders for this visit:    Pain in the coccyx  -     Fecal Immunochemical Test (iFOBT); Future  -     meloxicam (MOBIC) 7.5 MG tablet; Take 1 tablet (7.5 mg total) by mouth once daily.  Recommend he schedule ordered MRI.   Start mobic for pain.     History of alcoholism  -     Ambulatory referral/consult to Psychology; Future  -     Ambulatory referral/consult to Primary Care Behavioral Health (Non-Opioids); Future                 Katy Hurt MD   Internal Medicine   Primary Care      "

## 2025-02-07 ENCOUNTER — PATIENT MESSAGE (OUTPATIENT)
Dept: BEHAVIORAL HEALTH | Facility: CLINIC | Age: 45
End: 2025-02-07
Payer: COMMERCIAL

## 2025-02-25 ENCOUNTER — RESULTS FOLLOW-UP (OUTPATIENT)
Dept: INTERNAL MEDICINE | Facility: CLINIC | Age: 45
End: 2025-02-25

## 2025-02-25 ENCOUNTER — LAB VISIT (OUTPATIENT)
Dept: LAB | Facility: HOSPITAL | Age: 45
End: 2025-02-25
Payer: COMMERCIAL

## 2025-02-25 DIAGNOSIS — M53.3 PAIN IN THE COCCYX: ICD-10-CM

## 2025-02-25 LAB — HEMOCCULT STL QL IA: NEGATIVE

## 2025-02-25 PROCEDURE — 82274 ASSAY TEST FOR BLOOD FECAL: CPT | Performed by: INTERNAL MEDICINE
